# Patient Record
Sex: MALE | Race: WHITE | NOT HISPANIC OR LATINO | URBAN - METROPOLITAN AREA
[De-identification: names, ages, dates, MRNs, and addresses within clinical notes are randomized per-mention and may not be internally consistent; named-entity substitution may affect disease eponyms.]

---

## 2024-07-16 ENCOUNTER — ON-DEMAND VIRTUAL (OUTPATIENT)
Dept: URGENT CARE | Facility: CLINIC | Age: 34
End: 2024-07-16

## 2024-07-16 DIAGNOSIS — K52.9 GASTROENTERITIS: Primary | ICD-10-CM

## 2024-07-16 PROCEDURE — 99213 OFFICE O/P EST LOW 20 MIN: CPT | Mod: 95,,, | Performed by: PHYSICIAN ASSISTANT

## 2024-07-16 RX ORDER — ONDANSETRON 4 MG/1
4 TABLET, ORALLY DISINTEGRATING ORAL EVERY 8 HOURS PRN
Qty: 15 TABLET | Refills: 0 | Status: SHIPPED | OUTPATIENT
Start: 2024-07-16

## 2024-07-16 NOTE — PROGRESS NOTES
Subjective:      Patient ID: Nir Grayson is a 34 y.o. male.    Vitals:  vitals were not taken for this visit.     Chief Complaint: GI Problem      Visit Type: TELE AUDIOVISUAL    Present with the patient at the time of consultation: TELEMED PRESENT WITH PATIENT: None at home in [place]    No past medical history on file.  No past surgical history on file.  Review of patient's allergies indicates:  Not on File  No current outpatient medications on file prior to visit.     No current facility-administered medications on file prior to visit.     No family history on file.    Medications Ordered                [facility] [address] [place], [place] - [address]   [address] [facility] [address]    Telephone: [phone]   Fax: [phone]   Hours: Not open 24 hours                         E-Prescribed (1 of 1)              ondansetron (ZOFRAN-ODT) 4 MG TbDL    Sig: Take 1 tablet (4 mg total) by mouth every 8 (eight) hours as needed (nausea/vomiting).       Start: 7/16/24     Quantity: 15 tablet Refills: 0                           Ohs Peq Odvv Intake    7/16/2024  2:40 PM CDT - Filed by Patient   What is your current physical address in the event of a medical emergency? [address] [facility]   Are you able to take your vital signs? No   Please attach any relevant images or files          HPI  35yo male presents with c/o subjective fever, sweats, chills, fatigue, nausea, vomiting, abdominal cramping, diarrhea x two days. Having 5-6 episodes of diarrhea per day. Cramping worse after eating. Denies rash. Wife also with same symptoms before him.     Denies recent travel, hospitalizations, antibiotics. Did visit family member three weeks ago in hospital due to back injury.         Constitution: Positive for appetite change, chills, sweating and fever.   HENT: Negative.     Cardiovascular: Negative.    Respiratory: Negative.     Gastrointestinal:  Positive for abdominal pain,  nausea, vomiting and diarrhea. Negative for bright red blood in stool and dark colored stools.   Skin:  Negative for rash.   Neurological:  Negative for dizziness, light-headedness and headaches.        Objective:   The physical exam was conducted virtually.  Physical Exam   Constitutional: He is oriented to person, place, and time.  Non-toxic appearance. He does not appear ill. No distress.   HENT:   Head: Normocephalic and atraumatic.   Eyes: Conjunctivae are normal. No scleral icterus.   Pulmonary/Chest: Effort normal. No respiratory distress. He has no wheezes.   Abdominal: Normal appearance. He exhibits no distension. Soft. There is no abdominal tenderness.   Neurological: He is alert and oriented to person, place, and time. Coordination normal.   Skin: Skin is not diaphoretic, not pale and no rash. jaundice  Psychiatric: His behavior is normal. Judgment and thought content normal.       Assessment:     1. Gastroenteritis        Plan:       Gastroenteritis    Other orders  -     ondansetron (ZOFRAN-ODT) 4 MG TbDL; Take 1 tablet (4 mg total) by mouth every 8 (eight) hours as needed (nausea/vomiting).  Dispense: 15 tablet; Refill: 0    1.  I have sent over a prescription for nausea to your pharmacy, please take as directed.   2. Small sips of fluids every five minutes (water, gatorade, pedialyte, juice) and then advance to bland diet as tolerated. Recommend BRAT diet. Recommend to stay at home until 24 hours without diarrhea/vomiting.   3. If no improvement in symptoms in 2-3 days recommend to be seen at local urgent care or with your primary care provider. If sudden worsening of pain, fevers, persistent vomiting despite anti-emetic, bloody stools go immediately to emergency room for further evaluation.  4.  You must understand that you've received a Telehealth Urgent Care treatment only and that you may be released before all your medical problems are known or treated. You, the patient, will arrange for follow  up care as instructed.???Patient voiced understanding and agrees to plan.

## 2024-07-16 NOTE — PATIENT INSTRUCTIONS
1.  I have sent over a prescription for nausea to your pharmacy, please take as directed.   2. Small sips of fluids every five minutes (water, gatorade, pedialyte, juice) and then advance to bland diet as tolerated. Recommend BRAT diet. Recommend to stay at home until 24 hours without diarrhea/vomiting.   3. If no improvement in symptoms in 2-3 days recommend to be seen at local urgent care or with your primary care provider. If sudden worsening of pain, fevers, persistent vomiting despite anti-emetic, bloody stools go immediately to emergency room for further evaluation.  4.  You must understand that you've received a Telehealth Urgent Care treatment only and that you may be released before all your medical problems are known or treated. You, the patient, will arrange for follow up care as instructed.???

## 2024-07-16 NOTE — LETTER
July 16, 2024    Nir Grayson  [address]  [place] [address]             Virtual Visit - Urgent Care  Urgent Care  [address] [facility] [id number]   July 16, 2024     Patient: Nir Grayson   YOB: 1990   Date of Visit: 7/16/2024       To Whom it May Concern:    Nir Grayson was seen virtually on 7/16/2024. Please excuse him from work 7/15/2024 through 7/17/2024.    If you have any questions or concerns, please don't hesitate to call.    Sincerely,         Aurora Estrada PA-C